# Patient Record
Sex: FEMALE | Race: WHITE | NOT HISPANIC OR LATINO | Employment: PART TIME | URBAN - METROPOLITAN AREA
[De-identification: names, ages, dates, MRNs, and addresses within clinical notes are randomized per-mention and may not be internally consistent; named-entity substitution may affect disease eponyms.]

---

## 2022-03-07 ENCOUNTER — HOSPITAL ENCOUNTER (EMERGENCY)
Facility: CLINIC | Age: 22
Discharge: HOME | End: 2022-03-07
Admitting: PHYSICIAN ASSISTANT
Payer: COMMERCIAL

## 2022-03-07 VITALS
RESPIRATION RATE: 18 BRPM | OXYGEN SATURATION: 100 % | HEART RATE: 59 BPM | DIASTOLIC BLOOD PRESSURE: 94 MMHG | WEIGHT: 135 LBS | TEMPERATURE: 97.9 F | SYSTOLIC BLOOD PRESSURE: 136 MMHG

## 2022-03-07 DIAGNOSIS — N75.1 BARTHOLIN'S GLAND ABSCESS: ICD-10-CM

## 2022-03-07 PROCEDURE — 250N000011 HC RX IP 250 OP 636: Performed by: PHYSICIAN ASSISTANT

## 2022-03-07 PROCEDURE — 56420 I&D BARTHOLINS GLAND ABSCESS: CPT

## 2022-03-07 PROCEDURE — 250N000009 HC RX 250: Performed by: PHYSICIAN ASSISTANT

## 2022-03-07 PROCEDURE — 96372 THER/PROPH/DIAG INJ SC/IM: CPT | Mod: 59 | Performed by: PHYSICIAN ASSISTANT

## 2022-03-07 PROCEDURE — 99283 EMERGENCY DEPT VISIT LOW MDM: CPT | Mod: 25

## 2022-03-07 RX ORDER — ONDANSETRON 4 MG/1
4 TABLET, ORALLY DISINTEGRATING ORAL
Status: COMPLETED | OUTPATIENT
Start: 2022-03-07 | End: 2022-03-07

## 2022-03-07 RX ORDER — CEFIXIME 400 MG/1
400 CAPSULE ORAL DAILY
Qty: 7 CAPSULE | Refills: 0 | Status: SHIPPED | OUTPATIENT
Start: 2022-03-07 | End: 2022-03-14

## 2022-03-07 RX ORDER — OXYCODONE AND ACETAMINOPHEN 5; 325 MG/1; MG/1
1 TABLET ORAL EVERY 6 HOURS PRN
Qty: 4 TABLET | Refills: 0 | Status: SHIPPED | OUTPATIENT
Start: 2022-03-07 | End: 2022-03-10

## 2022-03-07 RX ORDER — HYDROMORPHONE HYDROCHLORIDE 1 MG/ML
0.5 INJECTION, SOLUTION INTRAMUSCULAR; INTRAVENOUS; SUBCUTANEOUS ONCE
Status: COMPLETED | OUTPATIENT
Start: 2022-03-07 | End: 2022-03-07

## 2022-03-07 RX ORDER — CLINDAMYCIN HCL 300 MG
300 CAPSULE ORAL 4 TIMES DAILY
Qty: 28 CAPSULE | Refills: 0 | Status: SHIPPED | OUTPATIENT
Start: 2022-03-07 | End: 2022-03-14

## 2022-03-07 RX ORDER — LIDOCAINE/PRILOCAINE 2.5 %-2.5%
1 CREAM (GRAM) TOPICAL ONCE
Status: COMPLETED | OUTPATIENT
Start: 2022-03-07 | End: 2022-03-07

## 2022-03-07 RX ADMIN — HYDROMORPHONE HYDROCHLORIDE 0.5 MG: 1 INJECTION, SOLUTION INTRAMUSCULAR; INTRAVENOUS; SUBCUTANEOUS at 16:07

## 2022-03-07 RX ADMIN — ONDANSETRON 4 MG: 4 TABLET, ORALLY DISINTEGRATING ORAL at 16:06

## 2022-03-07 RX ADMIN — LIDOCAINE AND PRILOCAINE 1 G: 25; 25 CREAM TOPICAL at 16:11

## 2022-03-07 ASSESSMENT — ENCOUNTER SYMPTOMS
HEMATURIA: 0
HEADACHES: 0
LIGHT-HEADEDNESS: 0
COLOR CHANGE: 0
CONFUSION: 0
NAUSEA: 0
ADENOPATHY: 0
TREMORS: 0
DIZZINESS: 0
FATIGUE: 0
FLANK PAIN: 0
DYSURIA: 0
AGITATION: 0
WEAKNESS: 0
ACTIVITY CHANGE: 0
DIFFICULTY URINATING: 0
SPEECH DIFFICULTY: 0
WOUND: 1
FEVER: 0
CHEST TIGHTNESS: 0

## 2022-03-07 NOTE — ED TRIAGE NOTES
Pt presents to the ED with c/o a bartholin cyst that has worsening in pain and size. Denies fevers. Currently on metronidazole.

## 2022-03-07 NOTE — DISCHARGE INSTRUCTIONS
"You were seen in the emergency department for a Bartholin gland abscess.  We placed a small incision and drained pus and blood from the abscess.  A \"word catheter\" is in place.  This is recommended to stay in place for 4 to 6 weeks to avoid recurrence.  It is inflated with approximately 4mL of air.  If the end will talk into the vagina, it may stay there, otherwise be careful while wiping not to pull on the catheter.  It will be helpful to wear a pad over the next several days as the abscess cavity may continue to leak fluid.    We are going to start you on 2 antibiotics called cefixime and clindamycin.  You should take these for 7 days. (Cefixime 400 mg once daily, clindamycin 300 mg 4 times a day) -- these were sent to your [facility] pharmacy on [address].     It is helpful to take a sitz bath twice a day.  Do not have sex while your catheter is in place.  I recommend calling your OB/GYN to discuss recent incision and drainage of your abscess, and consider clinic visit for wound recheck.    If you develop a fever, increased pain, significant redness or swelling at the abscess site please return to the emergency department.  "

## 2022-03-07 NOTE — ED PROVIDER NOTES
EMERGENCY DEPARTMENT ENCOUNTER      NAME: Lizzy Lares  AGE: 21 year old female  YOB: 2000  MRN: [id number]  EVALUATION DATE & TIME: 3/7/2022  3:19 PM    PCP: No primary care provider on file.    ED PROVIDER: Shea Keita PA-C      Chief Complaint   Patient presents with     Cyst         FINAL IMPRESSION:  No diagnosis found.      MEDICAL DECISION MAKING:    Pertinent Labs & Imaging studies reviewed. (See chart for details)  21 year old female otherwise healthy presents to the Emergency Department for evaluation of right-sided Bartholin cyst.  She was evaluated at OB/GYN clinic approximately 1 week ago, prior to a family trip to [place], it was noted that the cyst was approximately one-point centimeters in size, she was given conservative management with warm packs and instructions to manage if spontaneous drainage occurred.  Unfortunately, this continued to grow in size and became increasingly uncomfortable throughout the duration of the week.  On arrival, cyst measures approximately 4 cm in diameter, fluctuant and tender to palpation.  Vital signs are WNL.    EMLA cream was applied, followed by local anesthetic.  A 0.5 cm incision was made and copious amount of purulent fluid expressed.  Loculations were interrupted.  A Word catheter was placed, however well preparing for discharge this spontaneously fell out.  The wound was then packed with 1.4 in gauze, approximately 4 cm.  She was encouraged to continue with sits baths, and to keep her follow-up appointment with GYN in 2 days for wound recheck.  She was initiated on cefixime and clindamycin.    There is no evidence of acute or emergent process requiring intervention at this time. Pt is appropriate for outpatient management. Provisional nature of today's diagnosis was discussed and strict return precautions were given. Pt expressed understanding and She was discharged to home in good condition.     CRITICAL CARE: None    ED COURSE  3:25 PM   Met and evaluated patient. Discussed ED plan.   4:15 PM I&D performed  5:50 PM notified that Word catheter fell out, wound repacked with 1/4in plain gauze  6:50 PM discharged to home in good condition by RN.       MEDICATIONS GIVEN IN THE EMERGENCY:  Medications - No data to display    NEW PRESCRIPTIONS STARTED AT TODAY'S ER VISIT  New Prescriptions    No medications on file          =================================================================    HPI    Patient information was obtained from: patient    Use of Intrepreter: N/A     Lizzy Lares is a 21 year old female who presents for evaluation of Bartholin cyst.  She noticed this is several weeks ago, just returned from vacation in [place] after seeing her GYN and proceeding with expectant management including warm packs and expectation that it would spontaneously drain.  Unfortunately, while on vacation it did not spontaneously drained and continued to increase in size.  She was quite uncomfortable during her trip and on a plane flight home, she comes to the hospital directly from the airport.  She is not had any fevers.  No difficulty with voiding.      REVIEW OF SYSTEMS   Review of Systems   Constitutional: Negative for activity change, fatigue and fever.   Respiratory: Negative for chest tightness.    Gastrointestinal: Negative for nausea.   Genitourinary: Positive for genital sores and vaginal pain. Negative for difficulty urinating, dysuria, flank pain, hematuria, pelvic pain, vaginal bleeding and vaginal discharge.   Skin: Positive for wound. Negative for color change, pallor and rash.   Neurological: Negative for dizziness, tremors, speech difficulty, weakness, light-headedness and headaches.   Hematological: Negative for adenopathy.   Psychiatric/Behavioral: Negative for agitation and confusion.   All other systems reviewed and are negative.        PAST MEDICAL HISTORY:  No past medical history on file.    PAST SURGICAL HISTORY:  No past surgical  history on file.        CURRENT MEDICATIONS:    No current outpatient medications on file.      ALLERGIES:  No Known Allergies    FAMILY HISTORY:  No family history on file.    SOCIAL HISTORY:   Social History     Socioeconomic History     Marital status: Not on file     Spouse name: Not on file     Number of children: Not on file     Years of education: Not on file     Highest education level: Not on file   Occupational History     Not on file   Tobacco Use     Smoking status: Not on file     Smokeless tobacco: Not on file   Substance and Sexual Activity     Alcohol use: Not on file     Drug use: Not on file     Sexual activity: Not on file   Other Topics Concern     Not on file   Social History Narrative     Not on file     Social Determinants of Health     Financial Resource Strain: Not on file   Food Insecurity: Not on file   Transportation Needs: Not on file   Physical Activity: Not on file   Stress: Not on file   Social Connections: Not on file   Intimate Partner Violence: Not on file   Housing Stability: Not on file         VITALS:  Patient Vitals for the past 24 hrs:   BP Temp Temp src Pulse Resp SpO2 Weight   03/07/22 1448 (!) 136/94 97.9  F (36.6  C) Temporal 59 18 100 % 61.2 kg (135 lb)       PHYSICAL EXAM    Physical Exam  Vitals reviewed.   Constitutional:       General: She is not in acute distress.     Appearance: Normal appearance. She is not ill-appearing, toxic-appearing or diaphoretic.   HENT:      Head: Normocephalic and atraumatic.      Nose: No congestion.      Mouth/Throat:      Mouth: Mucous membranes are moist.      Pharynx: Oropharynx is clear.   Eyes:      General:         Right eye: No discharge.         Left eye: No discharge.   Cardiovascular:      Rate and Rhythm: Normal rate and regular rhythm.      Heart sounds: No murmur heard.  Pulmonary:      Effort: Pulmonary effort is normal. No respiratory distress.   Abdominal:      General: Abdomen is flat.      Palpations: Abdomen is soft.    Genitourinary:     Comments: Right sided bartholin cyst, 4cm in diameter  Musculoskeletal:         General: Normal range of motion.      Cervical back: Normal range of motion and neck supple.   Skin:     General: Skin is warm.      Capillary Refill: Capillary refill takes less than 2 seconds.      Coloration: Skin is not jaundiced.      Findings: Lesion present. No bruising, erythema or rash.   Neurological:      General: No focal deficit present.      Mental Status: She is alert and oriented to person, place, and time.      Cranial Nerves: No cranial nerve deficit.   Psychiatric:         Mood and Affect: Mood normal.         Behavior: Behavior normal.        Procedures:   PROCEDURE: Incision and Drainage   INDICATIONS: Localized abscess   PROCEDURE PROVIDER: Shea Keita PA-C   SITE: Right labia, bartholin cyst   MEDICATION: EMLA and 4 mLs of 1% Lidocaine without epinephrine   NOTE: The area was prepped with chlorhexidine and draped off in the usual sterile fashion.  Local anesthetic was injected subcutaneously with anesthesia effects demonstrated prior to proceeding.  The area of maximal fluctuance was opened with a # 11 Blade ([place]) using a Single Straight incision to allow for drainage.  The abscess was drained.  The abscess cavity was bluntly explored to separate any loculations. Plain Gauze was placed into the abscess cavity.  A sterile dressing was placed over the area.   COMPLEXITY: Complex    Simple = single, furuncle, paronychia, superficial  Complex = multiple or abscess requiring probing, loculations, packing placement   COMPLICATIONS: Patient tolerated procedure well, without complication           Shea Keita PA-C  Emergency Medicine  [facility] EMERGENCY ROOM  [address] [place] [id number] [phone]  Dept: [phone]    This note has in part been created with speech recognition technology and may create  an occasional, unintended word/grammar substitution. Errors are generally corrected in real time. Please message me via [employer] In Basket if you note any errors requiring clarification.       Shea Keita PA-C  03/07/22 1929

## 2022-07-24 ENCOUNTER — HEALTH MAINTENANCE LETTER (OUTPATIENT)
Age: 22
End: 2022-07-24

## 2022-10-03 ENCOUNTER — HEALTH MAINTENANCE LETTER (OUTPATIENT)
Age: 22
End: 2022-10-03

## 2023-08-13 ENCOUNTER — HEALTH MAINTENANCE LETTER (OUTPATIENT)
Age: 23
End: 2023-08-13

## 2024-10-05 ENCOUNTER — HEALTH MAINTENANCE LETTER (OUTPATIENT)
Age: 24
End: 2024-10-05